# Patient Record
Sex: FEMALE | Race: WHITE | NOT HISPANIC OR LATINO | Employment: UNEMPLOYED | URBAN - METROPOLITAN AREA
[De-identification: names, ages, dates, MRNs, and addresses within clinical notes are randomized per-mention and may not be internally consistent; named-entity substitution may affect disease eponyms.]

---

## 2021-09-04 ENCOUNTER — ANESTHESIA EVENT (OUTPATIENT)
Dept: PERIOP | Facility: HOSPITAL | Age: 36
End: 2021-09-04

## 2021-09-04 ENCOUNTER — HOSPITAL ENCOUNTER (OUTPATIENT)
Facility: HOSPITAL | Age: 36
Setting detail: HOSPITAL OUTPATIENT SURGERY
Discharge: HOME | End: 2021-09-04
Attending: INTERNAL MEDICINE | Admitting: INTERNAL MEDICINE

## 2021-09-04 ENCOUNTER — ANESTHESIA (OUTPATIENT)
Dept: PERIOP | Facility: HOSPITAL | Age: 36
End: 2021-09-04

## 2021-09-04 VITALS
HEART RATE: 77 BPM | BODY MASS INDEX: 36.88 KG/M2 | TEMPERATURE: 98 F | SYSTOLIC BLOOD PRESSURE: 127 MMHG | DIASTOLIC BLOOD PRESSURE: 98 MMHG | RESPIRATION RATE: 20 BRPM | OXYGEN SATURATION: 98 % | HEIGHT: 67 IN | WEIGHT: 235 LBS

## 2021-09-04 PROCEDURE — 25010000002 SUCCINYLCHOLINE PER 20 MG: Performed by: NURSE ANESTHETIST, CERTIFIED REGISTERED

## 2021-09-04 PROCEDURE — 25010000002 FENTANYL CITRATE (PF) 50 MCG/ML SOLUTION: Performed by: NURSE ANESTHETIST, CERTIFIED REGISTERED

## 2021-09-04 PROCEDURE — 99203 OFFICE O/P NEW LOW 30 MIN: CPT | Performed by: INTERNAL MEDICINE

## 2021-09-04 PROCEDURE — 25010000002 PROPOFOL 10 MG/ML EMULSION: Performed by: NURSE ANESTHETIST, CERTIFIED REGISTERED

## 2021-09-04 PROCEDURE — 25010000002 DEXAMETHASONE PER 1 MG: Performed by: NURSE ANESTHETIST, CERTIFIED REGISTERED

## 2021-09-04 PROCEDURE — 43247 EGD REMOVE FOREIGN BODY: CPT | Performed by: INTERNAL MEDICINE

## 2021-09-04 PROCEDURE — 25010000002 ONDANSETRON PER 1 MG: Performed by: NURSE ANESTHETIST, CERTIFIED REGISTERED

## 2021-09-04 RX ORDER — PROPOFOL 10 MG/ML
VIAL (ML) INTRAVENOUS AS NEEDED
Status: DISCONTINUED | OUTPATIENT
Start: 2021-09-04 | End: 2021-09-04 | Stop reason: SURG

## 2021-09-04 RX ORDER — DIPHENHYDRAMINE HYDROCHLORIDE 50 MG/ML
12.5 INJECTION INTRAMUSCULAR; INTRAVENOUS
Status: DISCONTINUED | OUTPATIENT
Start: 2021-09-04 | End: 2021-09-04 | Stop reason: HOSPADM

## 2021-09-04 RX ORDER — HYDRALAZINE HYDROCHLORIDE 20 MG/ML
5 INJECTION INTRAMUSCULAR; INTRAVENOUS
Status: DISCONTINUED | OUTPATIENT
Start: 2021-09-04 | End: 2021-09-04 | Stop reason: HOSPADM

## 2021-09-04 RX ORDER — DEXAMETHASONE SODIUM PHOSPHATE 10 MG/ML
INJECTION INTRAMUSCULAR; INTRAVENOUS AS NEEDED
Status: DISCONTINUED | OUTPATIENT
Start: 2021-09-04 | End: 2021-09-04 | Stop reason: SURG

## 2021-09-04 RX ORDER — ONDANSETRON 2 MG/ML
4 INJECTION INTRAMUSCULAR; INTRAVENOUS ONCE AS NEEDED
Status: DISCONTINUED | OUTPATIENT
Start: 2021-09-04 | End: 2021-09-04 | Stop reason: HOSPADM

## 2021-09-04 RX ORDER — EPHEDRINE SULFATE 50 MG/ML
5 INJECTION, SOLUTION INTRAVENOUS ONCE AS NEEDED
Status: DISCONTINUED | OUTPATIENT
Start: 2021-09-04 | End: 2021-09-04 | Stop reason: HOSPADM

## 2021-09-04 RX ORDER — BUSPIRONE HYDROCHLORIDE 10 MG/1
TABLET ORAL DAILY
COMMUNITY

## 2021-09-04 RX ORDER — LIDOCAINE HYDROCHLORIDE 20 MG/ML
INJECTION, SOLUTION INFILTRATION; PERINEURAL AS NEEDED
Status: DISCONTINUED | OUTPATIENT
Start: 2021-09-04 | End: 2021-09-04 | Stop reason: SURG

## 2021-09-04 RX ORDER — DROPERIDOL 2.5 MG/ML
1.25 INJECTION, SOLUTION INTRAMUSCULAR; INTRAVENOUS ONCE AS NEEDED
Status: DISCONTINUED | OUTPATIENT
Start: 2021-09-04 | End: 2021-09-04 | Stop reason: HOSPADM

## 2021-09-04 RX ORDER — FLUMAZENIL 0.1 MG/ML
0.2 INJECTION INTRAVENOUS AS NEEDED
Status: DISCONTINUED | OUTPATIENT
Start: 2021-09-04 | End: 2021-09-04 | Stop reason: HOSPADM

## 2021-09-04 RX ORDER — FAMOTIDINE 10 MG/ML
20 INJECTION, SOLUTION INTRAVENOUS ONCE
Status: COMPLETED | OUTPATIENT
Start: 2021-09-04 | End: 2021-09-04

## 2021-09-04 RX ORDER — DIPHENHYDRAMINE HCL 25 MG
25 CAPSULE ORAL
Status: DISCONTINUED | OUTPATIENT
Start: 2021-09-04 | End: 2021-09-04 | Stop reason: HOSPADM

## 2021-09-04 RX ORDER — LIDOCAINE HYDROCHLORIDE 10 MG/ML
0.5 INJECTION, SOLUTION EPIDURAL; INFILTRATION; INTRACAUDAL; PERINEURAL ONCE AS NEEDED
Status: DISCONTINUED | OUTPATIENT
Start: 2021-09-04 | End: 2021-09-04 | Stop reason: HOSPADM

## 2021-09-04 RX ORDER — HYDROCODONE BITARTRATE AND ACETAMINOPHEN 7.5; 325 MG/1; MG/1
1 TABLET ORAL ONCE AS NEEDED
Status: DISCONTINUED | OUTPATIENT
Start: 2021-09-04 | End: 2021-09-04 | Stop reason: HOSPADM

## 2021-09-04 RX ORDER — PROMETHAZINE HYDROCHLORIDE 25 MG/1
25 SUPPOSITORY RECTAL ONCE AS NEEDED
Status: DISCONTINUED | OUTPATIENT
Start: 2021-09-04 | End: 2021-09-04 | Stop reason: HOSPADM

## 2021-09-04 RX ORDER — ACETAMINOPHEN 325 MG/1
650 TABLET ORAL ONCE AS NEEDED
Status: COMPLETED | OUTPATIENT
Start: 2021-09-04 | End: 2021-09-04

## 2021-09-04 RX ORDER — DROPERIDOL 2.5 MG/ML
0.62 INJECTION, SOLUTION INTRAMUSCULAR; INTRAVENOUS ONCE AS NEEDED
Status: DISCONTINUED | OUTPATIENT
Start: 2021-09-04 | End: 2021-09-04 | Stop reason: HOSPADM

## 2021-09-04 RX ORDER — SODIUM CHLORIDE 0.9 % (FLUSH) 0.9 %
3 SYRINGE (ML) INJECTION EVERY 12 HOURS SCHEDULED
Status: DISCONTINUED | OUTPATIENT
Start: 2021-09-04 | End: 2021-09-04 | Stop reason: HOSPADM

## 2021-09-04 RX ORDER — ROCURONIUM BROMIDE 10 MG/ML
INJECTION, SOLUTION INTRAVENOUS AS NEEDED
Status: DISCONTINUED | OUTPATIENT
Start: 2021-09-04 | End: 2021-09-04 | Stop reason: SURG

## 2021-09-04 RX ORDER — NALOXONE HCL 0.4 MG/ML
0.2 VIAL (ML) INJECTION AS NEEDED
Status: DISCONTINUED | OUTPATIENT
Start: 2021-09-04 | End: 2021-09-04 | Stop reason: HOSPADM

## 2021-09-04 RX ORDER — VALACYCLOVIR HYDROCHLORIDE 500 MG/1
TABLET, FILM COATED ORAL DAILY
COMMUNITY

## 2021-09-04 RX ORDER — FEXOFENADINE HCL 180 MG/1
TABLET ORAL DAILY
COMMUNITY

## 2021-09-04 RX ORDER — ONDANSETRON 2 MG/ML
INJECTION INTRAMUSCULAR; INTRAVENOUS AS NEEDED
Status: DISCONTINUED | OUTPATIENT
Start: 2021-09-04 | End: 2021-09-04 | Stop reason: SURG

## 2021-09-04 RX ORDER — HYDROMORPHONE HYDROCHLORIDE 1 MG/ML
0.5 INJECTION, SOLUTION INTRAMUSCULAR; INTRAVENOUS; SUBCUTANEOUS
Status: DISCONTINUED | OUTPATIENT
Start: 2021-09-04 | End: 2021-09-04 | Stop reason: HOSPADM

## 2021-09-04 RX ORDER — FENTANYL CITRATE 50 UG/ML
50 INJECTION, SOLUTION INTRAMUSCULAR; INTRAVENOUS
Status: DISCONTINUED | OUTPATIENT
Start: 2021-09-04 | End: 2021-09-04 | Stop reason: HOSPADM

## 2021-09-04 RX ORDER — SODIUM CHLORIDE 0.9 % (FLUSH) 0.9 %
3-10 SYRINGE (ML) INJECTION AS NEEDED
Status: DISCONTINUED | OUTPATIENT
Start: 2021-09-04 | End: 2021-09-04 | Stop reason: HOSPADM

## 2021-09-04 RX ORDER — SUCCINYLCHOLINE CHLORIDE 20 MG/ML
INJECTION INTRAMUSCULAR; INTRAVENOUS AS NEEDED
Status: DISCONTINUED | OUTPATIENT
Start: 2021-09-04 | End: 2021-09-04 | Stop reason: SURG

## 2021-09-04 RX ORDER — SODIUM CHLORIDE, SODIUM LACTATE, POTASSIUM CHLORIDE, CALCIUM CHLORIDE 600; 310; 30; 20 MG/100ML; MG/100ML; MG/100ML; MG/100ML
9 INJECTION, SOLUTION INTRAVENOUS CONTINUOUS
Status: DISCONTINUED | OUTPATIENT
Start: 2021-09-04 | End: 2021-09-04 | Stop reason: HOSPADM

## 2021-09-04 RX ORDER — PROMETHAZINE HYDROCHLORIDE 25 MG/1
25 TABLET ORAL ONCE AS NEEDED
Status: DISCONTINUED | OUTPATIENT
Start: 2021-09-04 | End: 2021-09-04 | Stop reason: HOSPADM

## 2021-09-04 RX ORDER — OMEPRAZOLE 20 MG/1
CAPSULE, DELAYED RELEASE ORAL DAILY
COMMUNITY

## 2021-09-04 RX ORDER — LABETALOL HYDROCHLORIDE 5 MG/ML
5 INJECTION, SOLUTION INTRAVENOUS
Status: DISCONTINUED | OUTPATIENT
Start: 2021-09-04 | End: 2021-09-04 | Stop reason: HOSPADM

## 2021-09-04 RX ORDER — IBUPROFEN 600 MG/1
600 TABLET ORAL ONCE AS NEEDED
Status: DISCONTINUED | OUTPATIENT
Start: 2021-09-04 | End: 2021-09-04 | Stop reason: HOSPADM

## 2021-09-04 RX ADMIN — ACETAMINOPHEN 650 MG: 325 TABLET, FILM COATED ORAL at 15:28

## 2021-09-04 RX ADMIN — ONDANSETRON 4 MG: 2 INJECTION INTRAMUSCULAR; INTRAVENOUS at 11:38

## 2021-09-04 RX ADMIN — LIDOCAINE HYDROCHLORIDE 60 MG: 20 INJECTION, SOLUTION INFILTRATION; PERINEURAL at 11:29

## 2021-09-04 RX ADMIN — FENTANYL CITRATE 50 MCG: 0.05 INJECTION, SOLUTION INTRAMUSCULAR; INTRAVENOUS at 13:00

## 2021-09-04 RX ADMIN — DEXAMETHASONE SODIUM PHOSPHATE 8 MG: 10 INJECTION INTRAMUSCULAR; INTRAVENOUS at 11:38

## 2021-09-04 RX ADMIN — ROCURONIUM BROMIDE 5 MG: 50 INJECTION INTRAVENOUS at 11:29

## 2021-09-04 RX ADMIN — PROPOFOL 250 MG: 10 INJECTION, EMULSION INTRAVENOUS at 11:29

## 2021-09-04 RX ADMIN — SUCCINYLCHOLINE CHLORIDE 120 MG: 20 INJECTION, SOLUTION INTRAMUSCULAR; INTRAVENOUS; PARENTERAL at 11:29

## 2021-09-04 RX ADMIN — FAMOTIDINE 20 MG: 10 INJECTION INTRAVENOUS at 11:12

## 2021-09-04 RX ADMIN — FENTANYL CITRATE 50 MCG: 0.05 INJECTION, SOLUTION INTRAMUSCULAR; INTRAVENOUS at 13:12

## 2021-09-04 RX ADMIN — SODIUM CHLORIDE, POTASSIUM CHLORIDE, SODIUM LACTATE AND CALCIUM CHLORIDE 9 ML/HR: 600; 310; 30; 20 INJECTION, SOLUTION INTRAVENOUS at 11:12

## 2021-09-04 NOTE — ANESTHESIA PROCEDURE NOTES
Airway  Urgency: elective    Date/Time: 9/4/2021 11:32 AM  Airway not difficult    General Information and Staff    Patient location during procedure: OR  Anesthesiologist: Marky Solis MD  CRNA: Lorrie Jones CRNA    Indications and Patient Condition  Indications for airway management: airway protection    Preoxygenated: yes (pt pre-O2 with 100% O2)  Mask difficulty assessment: 0 - not attempted    Final Airway Details  Final airway type: endotracheal airway      Successful airway: ETT  Cuffed: yes   Successful intubation technique: direct laryngoscopy and RSI  Facilitating devices/methods: cricoid pressure  Endotracheal tube insertion site: oral  Blade: Epi  Blade size: 4  ETT size (mm): 7.0  Cormack-Lehane Classification: grade I - full view of glottis  Placement verified by: chest auscultation and capnometry   Cuff volume (mL): 7  Measured from: lips  ETT/EBT  to lips (cm): 20  Number of attempts at approach: 1  Assessment: lips, teeth, and gum same as pre-op and atraumatic intubation    Additional Comments  ATOETx1. No change in dentition.

## 2021-09-04 NOTE — ANESTHESIA POSTPROCEDURE EVALUATION
"Patient: Lisbeth Cornell    Procedure Summary     Date: 09/04/21 Room / Location: [facility] OR 03 / [facility] MAIN OR    Anesthesia Start: 1126 Anesthesia Stop: 1249    Procedure: ESOPHAGOGASTRODUODENOSCOPY FOR FOOD BOLUS REMOVAL (N/A Esophagus) Diagnosis:     Surgeons: Carolyn Galicia MD Provider: Marky Solis MD    Anesthesia Type: general ASA Status: 3 - Emergent          Anesthesia Type: general    Vitals  Vitals Value Taken Time   /110 09/04/21 1431   Temp 36.7 °C (98 °F) 09/04/21 1253   Pulse 69 09/04/21 1447   Resp 20 09/04/21 1415   SpO2 97 % 09/04/21 1447   Vitals shown include unvalidated device data.        Post Anesthesia Care and Evaluation    Patient location during evaluation: bedside  Patient participation: complete - patient participated  Level of consciousness: awake and alert  Pain management: adequate  Airway patency: patent  Anesthetic complications: No anesthetic complications    Cardiovascular status: acceptable  Respiratory status: acceptable  Hydration status: acceptable    Comments: /98   Pulse 72   Temp 36.7 °C (98 °F) (Oral)   Resp 20   Ht 170.2 cm (67\")   Wt 107 kg (235 lb)   SpO2 100%   BMI 36.81 kg/m²       "

## 2021-09-04 NOTE — DISCHARGE INSTRUCTIONS
Recommendations:    Soft diet until repeat EGD  Continue omeprazole  Dr Galicia office will contact to arrange follow up

## 2021-09-04 NOTE — ANESTHESIA PREPROCEDURE EVALUATION
Anesthesia Evaluation     Patient summary reviewed and Nursing notes reviewed   no history of anesthetic complications:  NPO Solid Status: > 6 hours  NPO Liquid Status: > 2 hours           Airway   Mallampati: II  TM distance: <3 FB  Neck ROM: full  Possible difficult intubation  Dental          Pulmonary - normal exam   (+) sleep apnea (not using machine ),   (-) not a smoker  Cardiovascular - normal exam    (-) angina      Neuro/Psych  GI/Hepatic/Renal/Endo    (+)  GERD,      ROS Comment: Food Bolus Chicken last night     Musculoskeletal     Abdominal    Substance History      OB/GYN    (-)  Pregnant        Other            Phys Exam Other: Pt refuses to remove upper lip piercing and understands my concerns for it accidentally falling out and possibly aspirating into lungs              Anesthesia Plan    ASA 3 - emergent     general       Anesthetic plan, all risks, benefits, and alternatives have been provided, discussed and informed consent has been obtained with: patient.

## 2021-09-04 NOTE — CONSULTS
"[facility]  H and P    Referring Provider: Dr Rucker-- [facility]    Reason for Admission: Esophageal food bolus    Subjective     History of present illness:      This is a 35-year-old woman with a past medical history as below here for emergent food bolus removal.  She is from [facility].  She initially presented to [facility] and the ER doctor, Dr. Rucker, called the on-call service.  I advised transfer to the ER which apparently could not be done.  Patient was supposed to go to [facility] but apparently ended up just being sent to preop holding area to have this procedure done.  She was eating dinner with her in-laws last night.  She reports that she ate too quickly and she does not have back molars, she got chicken stuck in her esophagus.  She tried to vomit but was not able to get all of the bolus out.  Since then, she has not been able to tolerate her secretions nor drink water.  She is regurgitating up her secretions with some discomfort.  She reports that a similar episode happened 1 year ago.  She does say that she has heartburn and takes omeprazole on a regular basis.  She does not recall the details of the episode 1 year ago: She says this was in [place].  She does not recall if she had a stricture nor dilatation.    She does take omeprazole on a regular basis    She smokes 1 to 1.5 packs daily    She denies any cardiac issues.  She does not take aspirin.  She is not on any blood thinners.  She says she does not drink alcohol excessively    She denies any family history of GI malignancies    She is on disability for \"mental issues.\"    Past Medical History:  Past Medical History:   Diagnosis Date   • Asthma    • GERD (gastroesophageal reflux disease)    • Sleep apnea        Past Surgical History:  Past Surgical History:   Procedure Laterality Date   • BLADDER SUSPENSION     •  SECTION     • GALLBLADDER SURGERY     • HYSTERECTOMY     • TONSILLECTOMY          Social " History:   Social History     Tobacco Use   • Smoking status: Current Every Day Smoker     Packs/day: 1.50     Types: Cigarettes   • Smokeless tobacco: Never Used   Substance Use Topics   • Alcohol use: Not Currently        Family History:  History reviewed. No pertinent family history.    Home Meds:  Medications Prior to Admission   Medication Sig Dispense Refill Last Dose   • busPIRone (BUSPAR) 10 MG tablet Take  by mouth Daily.   9/3/2021 at 2000   • fexofenadine (ALLEGRA) 180 MG tablet Take  by mouth Daily.   9/3/2021 at 2000   • omeprazole (priLOSEC) 20 MG capsule Take  by mouth Daily.   9/3/2021 at 2000   • valACYclovir (VALTREX) 500 MG tablet Take  by mouth Daily.   9/3/2021 at 2000       Current Meds:   sodium chloride, 3 mL, Intravenous, Q12H        Allergies:  Allergies   Allergen Reactions   • Morphine Nausea And Vomiting   • Sulfa Antibiotics Hives   • Tape Hives   • Levaquin [Levofloxacin] Hives       Review of Systems  All systems were reviewed and negative except for:  Gastrointestinal: positive for  difficulty / pain with swallowing and vomiting     Objective     Vital Signs  Temp:  [98 °F (36.7 °C)] 98 °F (36.7 °C)  Heart Rate:  [63] 63  Resp:  [18] 18  BP: (121)/(72) 121/72    Physical Exam:  Constitutional:   Alert, cooperative, in no mild distress, appears stated age   Eyes:           Lids and lashes normal, conjunctivae and sclerae normal,   no icterus   Ears, nose, mouth and throat:  Normal appearance of external ears and nose, no oral l  lesions, no thrush, oral mucosa moist   Respiratory:    Clear to auscultation, respirations regular, even and             unlabored    Cardiovascular:   Regular rhythm and normal rate, normal S1 and S2, no        murmur, no gallop, palpable distal pulses, no lower extremity edema   Gastrointestinal:    Soft, obese, nondistended, nontender to palpation, no guarding, no rebound tenderness, normal bowel sounds, no palpable masses or organomegaly  Rectal exam:  deferred   Musculoskeletal:  Normal station, no atrophy, no tenderness to palpation, normal digits and nails   Skin:  Normal color, no bleeding, bruising, rashes or lesions   Lymphatics:  No palpable cervical or supraclavicular adenopathy   Psychiatric:  Judgement and insight: normal   Orientation to person, place and time: normal   Mood and affect: normal                 Invalid input(s): LABALBU, PROT          No results found for: LIPASE    Radiology:  Imaging Results (Last 72 Hours)     ** No results found for the last 72 hours. **          Assessment/Plan     Impression  1.  Esophageal food bolus: Acute issue though this has occurred to her before    2.  Heartburn: On chronic PPI therapy    Plan  EGD with food bolus removal.  I discussed the procedure, benefits of removing the food bolus and risk including perforation, bleeding.  I discussed that she will have to be on a soft food diet following the procedure.  I discussed that she likely will need to come back for consideration of esophageal dilation if necessary.  I discussed that she will need to continue her home dose of omeprazole.    I discussed the patients findings and my recommendations with patient and Dr Rucker    All necessary PPE, including face mask and eye protection, were worn during this encounter.  Hand sanitization was performed both before and after the patient interaction.    Carolyn Galicia MD  [facility]      Dictated utilizing Dragon dictation

## 2021-09-04 NOTE — PROGRESS NOTES
Brief GI procedure note: EGD    Large piece of chicken painstakingly removed.      Maceration, narrowing of distal esophagus    Gastritis    Normal duodenum    Recommendations  GI soft diet until repeat EGD  Continue omeprazole  My office will contact her to arrange follow up    Carolyn Galicia MD  [facility]

## 2021-09-07 ENCOUNTER — TELEPHONE (OUTPATIENT)
Dept: GASTROENTEROLOGY | Facility: CLINIC | Age: 36
End: 2021-09-07

## 2021-09-07 NOTE — TELEPHONE ENCOUNTER
----- Message from Isabel Dias Rep sent at 9/7/2021  3:43 PM EDT -----  Regarding: Questions  Contact: [phone]  Pt had scope on 9/4 and has been on liquid diet since and wants to know if she can go back to eating regular

## 2021-09-08 NOTE — TELEPHONE ENCOUNTER
I would hold off on hamburgers and tacos for the next week to give everything a chance to heal up. She needs to eat very carefully. She needs to follow-up with a gastroenterologist closer to home if she is not willing to return to [place] for further evaluation and treatment.

## 2021-09-08 NOTE — TELEPHONE ENCOUNTER
Called pt and advised of Dr Galicia note. Verb understanding but states she is not going to following in [place] because it is too far. She states she will f/u with her pcp.      Also pt is asking if she can eat hamburgers and tacos. Advised will send message to Dr Galicia.

## (undated) DEVICE — CANN O2 ETCO2 FITS ALL CONN CO2 SMPL A/ 7IN DISP LF

## (undated) DEVICE — KT ORCA ORCAPOD DISP STRL

## (undated) DEVICE — TUBING, SUCTION, 1/4" X 10', STRAIGHT: Brand: MEDLINE

## (undated) DEVICE — BITEBLOCK OMNI BLOC

## (undated) DEVICE — THE DISPOSABLE RAPTOR GRASPING DEVICE IS USED TO GRASP TISSUE AND/OR RETRIEVE FOREIGN BODIES, EXCISED TISSUE AND STENTS DURING ENDOSCOPIC PROCEDURES.: Brand: RAPTOR

## (undated) DEVICE — ADAPT CLN BIOGUARD AIR/H2O DISP

## (undated) DEVICE — SENSR O2 OXIMAX FNGR A/ 18IN NONSTR

## (undated) DEVICE — LN SMPL CO2 SHTRM SD STREAM W/M LUER

## (undated) DEVICE — SNAR POLYP SENSATION STDOVL 27 240 BX40

## (undated) DEVICE — FRCP BX RADJAW4 NDL 2.8 240CM LG OG BX40